# Patient Record
Sex: FEMALE | Race: AMERICAN INDIAN OR ALASKA NATIVE | NOT HISPANIC OR LATINO | URBAN - METROPOLITAN AREA
[De-identification: names, ages, dates, MRNs, and addresses within clinical notes are randomized per-mention and may not be internally consistent; named-entity substitution may affect disease eponyms.]

---
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
## 2025-07-18 ENCOUNTER — NON-APPOINTMENT (OUTPATIENT)
Age: 20
End: 2025-07-18

## 2025-07-18 ENCOUNTER — APPOINTMENT (OUTPATIENT)
Age: 20
End: 2025-07-18
Payer: COMMERCIAL

## 2025-07-18 ENCOUNTER — LABORATORY RESULT (OUTPATIENT)
Age: 20
End: 2025-07-18

## 2025-07-18 VITALS
WEIGHT: 99 LBS | BODY MASS INDEX: 16.5 KG/M2 | HEIGHT: 65 IN | HEART RATE: 76 BPM | DIASTOLIC BLOOD PRESSURE: 59 MMHG | SYSTOLIC BLOOD PRESSURE: 91 MMHG

## 2025-07-18 PROBLEM — Z78.9 NON-SMOKER: Status: ACTIVE | Noted: 2025-07-18

## 2025-07-18 PROBLEM — Z11.3 ROUTINE SCREENING FOR STI (SEXUALLY TRANSMITTED INFECTION): Status: ACTIVE | Noted: 2025-07-18

## 2025-07-18 PROBLEM — Z01.419 ENCOUNTER FOR ANNUAL ROUTINE GYNECOLOGICAL EXAMINATION: Status: ACTIVE | Noted: 2025-07-18

## 2025-07-18 PROCEDURE — 99385 PREV VISIT NEW AGE 18-39: CPT

## 2025-07-18 RX ORDER — NORETHINDRONE ACETATE AND ETHINYL ESTRADIOL AND FERROUS FUMARATE 1MG-20(24)
1-20 KIT ORAL DAILY
Qty: 1 | Refills: 11 | Status: ACTIVE | COMMUNITY
Start: 2025-07-18 | End: 1900-01-01